# Patient Record
Sex: MALE | Race: OTHER | NOT HISPANIC OR LATINO | URBAN - METROPOLITAN AREA
[De-identification: names, ages, dates, MRNs, and addresses within clinical notes are randomized per-mention and may not be internally consistent; named-entity substitution may affect disease eponyms.]

---

## 2019-11-17 ENCOUNTER — EMERGENCY (EMERGENCY)
Facility: HOSPITAL | Age: 34
LOS: 1 days | Discharge: HOME | End: 2019-11-17
Attending: EMERGENCY MEDICINE | Admitting: EMERGENCY MEDICINE
Payer: OTHER MISCELLANEOUS

## 2019-11-17 VITALS
RESPIRATION RATE: 17 BRPM | OXYGEN SATURATION: 98 % | TEMPERATURE: 98 F | SYSTOLIC BLOOD PRESSURE: 156 MMHG | DIASTOLIC BLOOD PRESSURE: 110 MMHG | HEART RATE: 86 BPM

## 2019-11-17 VITALS
TEMPERATURE: 98 F | HEIGHT: 68 IN | WEIGHT: 255.07 LBS | RESPIRATION RATE: 16 BRPM | OXYGEN SATURATION: 98 % | HEART RATE: 100 BPM | DIASTOLIC BLOOD PRESSURE: 117 MMHG | SYSTOLIC BLOOD PRESSURE: 162 MMHG

## 2019-11-17 PROCEDURE — 99283 EMERGENCY DEPT VISIT LOW MDM: CPT | Mod: 25

## 2019-11-17 PROCEDURE — 73030 X-RAY EXAM OF SHOULDER: CPT | Mod: 26,LT

## 2019-11-17 PROCEDURE — 99053 MED SERV 10PM-8AM 24 HR FAC: CPT

## 2019-11-17 RX ORDER — LIDOCAINE 4 G/100G
1 CREAM TOPICAL ONCE
Refills: 0 | Status: COMPLETED | OUTPATIENT
Start: 2019-11-17 | End: 2019-11-17

## 2019-11-17 RX ORDER — IBUPROFEN 200 MG
600 TABLET ORAL ONCE
Refills: 0 | Status: COMPLETED | OUTPATIENT
Start: 2019-11-17 | End: 2019-11-17

## 2019-11-17 RX ADMIN — Medication 600 MILLIGRAM(S): at 04:37

## 2019-11-17 RX ADMIN — LIDOCAINE 1 PATCH: 4 CREAM TOPICAL at 04:36

## 2019-11-17 NOTE — ED PROVIDER NOTE - CLINICAL SUMMARY MEDICAL DECISION MAKING FREE TEXT BOX
shoulder pain, tender, pain w/ certain ROM, neurovascular intact, xray, analgesia, sling for comfort

## 2019-11-17 NOTE — ED PROVIDER NOTE - OBJECTIVE STATEMENT
34 yom pw left shoulder pain, after falling onto ground with left hand bracing himself (pushup position with 1 arm), pain to left shoulder, anterior, worse w/ movement, no paresthesia.  no other injury/pain.

## 2019-11-17 NOTE — ED PROVIDER NOTE - NSFOLLOWUPINSTRUCTIONS_ED_ALL_ED_FT
Follow up with your primary care doctor  Please remove the lidocaine patch we applied at around 4pm today (11/17/2019)  Alternate tylenol/motrin if you do not have any allergies/intolerance   You can take 600mg of motrin every 6 hours with food as needed  You can take 1000mg of tylenol every 6 hours as needed  Ice for swelling as needed (5-10 minutes every hour)  Keep it elevated to reduce swelling (with sling)  Return immediately for any new or worsening symptoms or any new concerns

## 2019-11-17 NOTE — ED PROVIDER NOTE - PATIENT PORTAL LINK FT
You can access the FollowMyHealth Patient Portal offered by [facility] by registering at the following website: [email or web address]. By joining [email or web address]’s FollowMyHealth portal, you will also be able to view your health information using other applications (apps) compatible with our system.

## 2019-11-17 NOTE — ED ADULT TRIAGE NOTE - CHIEF COMPLAINT QUOTE
BIBA for left shoulder pain. States he injured it around 5pm yesterday while apprehending an EDP at work. Pt is right hand dominant

## 2019-11-17 NOTE — ED PROVIDER NOTE - DIAGNOSTIC INTERPRETATION
ER Physician: Hoang Garcia  SHOULDER XRAY INTERPRETATION:  no acute fracture; no soft tissue swelling noted; normal bony alignment.

## 2019-11-23 DIAGNOSIS — M25.512 PAIN IN LEFT SHOULDER: ICD-10-CM

## 2024-11-07 ENCOUNTER — NON-APPOINTMENT (OUTPATIENT)
Age: 39
End: 2024-11-07

## 2024-11-08 ENCOUNTER — EMERGENCY (EMERGENCY)
Facility: HOSPITAL | Age: 39
LOS: 1 days | Discharge: HOME | End: 2024-11-08
Attending: EMERGENCY MEDICINE | Admitting: EMERGENCY MEDICINE
Payer: COMMERCIAL

## 2024-11-08 VITALS
DIASTOLIC BLOOD PRESSURE: 84 MMHG | TEMPERATURE: 99 F | SYSTOLIC BLOOD PRESSURE: 147 MMHG | RESPIRATION RATE: 17 BRPM | OXYGEN SATURATION: 100 % | HEART RATE: 74 BPM | WEIGHT: 216.93 LBS

## 2024-11-08 PROCEDURE — 10060 I&D ABSCESS SIMPLE/SINGLE: CPT

## 2024-11-08 PROCEDURE — 99283 EMERGENCY DEPT VISIT LOW MDM: CPT | Mod: 25

## 2024-11-08 PROCEDURE — 99282 EMERGENCY DEPT VISIT SF MDM: CPT | Mod: 25

## 2024-11-08 PROCEDURE — 30020 DRAINAGE OF NOSE LESION: CPT | Mod: LT

## 2024-11-08 NOTE — ED ADULT TRIAGE NOTE - HEART RATE (BEATS/MIN)
76 no difficulty bearing weight/no weakness/no numbness/no abrasion/no fever/no deformity/no bruising/no tingling/no back pain

## 2024-11-08 NOTE — ED PROVIDER NOTE - NSFOLLOWUPINSTRUCTIONS_ED_ALL_ED_FT
Otolaryngology (ENT)  [address] [address]  Phone: [phone]  Fax: [phone]    Please continue your antibiotics as prescribed and see ENT for followup.  Call for appointment.  If you have any problems with followup, please call the ED Referral Coordinator at [phone].  Return to the ER if symptoms worsen or other concerns.    Abscess    An abscess is an infected area that contains a collection of pus and debris. It can occur in almost any part of the body and occurs when the tissue gets infection. Symptoms include a painful mass that is red, warm, tender that might break open and HAVE drainage. If your health care provider gave you antibiotics make sure to take the full course and do not stop even if feeling better.     SEEK IMMEDIATE MEDICAL CARE IF YOU HAVE ANY OF THE FOLLOWING SYMPTOMS: chills, fever, muscle aches, or red streaking from the area.

## 2024-11-08 NOTE — ED ADULT NURSE NOTE - CHIEF COMPLAINT QUOTE
pt c/o left nostril abscess since last Sunday and pus coming out, went to UC yesterday given  Clindamycin and mupirocin.  stated " he had one abscess in october, and now is back saw ENT"

## 2024-11-08 NOTE — ED PROVIDER NOTE - CARE PROVIDERS DIRECT ADDRESSES
[email or web address]

## 2024-11-08 NOTE — ED PROVIDER NOTE - CLINICAL SUMMARY MEDICAL DECISION MAKING FREE TEXT BOX
intranasal nares abscess. somewhat firm with scabbed over component. I and d performed with min pus extracted. just started antibiotics. will continue, f/u ent for further eval. discussed need to r/o underlying mass. return if worsens

## 2024-11-08 NOTE — ED PROVIDER NOTE - ENMT, MLM
left nares with swelling/ mild redness exteriorly. interiorly with 0.5 cm scabbed over growth laterally, mostly firm/ tender.

## 2024-11-08 NOTE — ED PROVIDER NOTE - OBJECTIVE STATEMENT
here with pain and swelling of left nares x several days. Had some sinus problems last month. Went to ENT and had scope, told he had some fluid, prescribed antibiotic x 10 day (unsure name). Seemed to improve a little then pain in left nose started, gradually worsening with swelling. Went to urgent care yesterday and prescribed clindamycin and mupirocin ointment which he started taking. No fever, chills.

## 2024-11-08 NOTE — ED PROVIDER NOTE - CARE PROVIDER_API CALL
Michael Sosa  Otolaryngology  [address] [place] [address], [address]  [place] [address]  Phone: [phone]  Fax: [phone]  Follow Up Time:

## 2024-11-08 NOTE — ED ADULT TRIAGE NOTE - CHIEF COMPLAINT QUOTE
pt c/o left nostril abscess since last Sunday, went to UC yesterday given  Clindamycin and mupirocin.  stated " he had one abscess in october, and now is back saw ENT" pt c/o left nostril abscess since last Sunday and pus coming out, went to UC yesterday given  Clindamycin and mupirocin.  stated " he had one abscess in october, and now is back saw ENT"

## 2024-11-08 NOTE — ED PROVIDER NOTE - PATIENT PORTAL LINK FT
You can access the FollowMyHealth Patient Portal offered by [facility] by registering at the following website: [email or web address]. By joining [employer]’s FollowMyHealth portal, you will also be able to view your health information using other applications (apps) compatible with our system.

## 2024-11-11 DIAGNOSIS — J34.0 ABSCESS, FURUNCLE AND CARBUNCLE OF NOSE: ICD-10-CM

## 2024-11-11 PROBLEM — Z78.9 OTHER SPECIFIED HEALTH STATUS: Chronic | Status: ACTIVE | Noted: 2024-11-08

## 2024-11-19 PROBLEM — Z00.00 ENCOUNTER FOR PREVENTIVE HEALTH EXAMINATION: Status: ACTIVE | Noted: 2024-11-19

## 2024-11-20 ENCOUNTER — NON-APPOINTMENT (OUTPATIENT)
Age: 39
End: 2024-11-20

## 2024-11-20 ENCOUNTER — APPOINTMENT (OUTPATIENT)
Dept: OTOLARYNGOLOGY | Facility: CLINIC | Age: 39
End: 2024-11-20
Payer: COMMERCIAL

## 2024-11-20 VITALS — WEIGHT: 217 LBS | HEIGHT: 72 IN | BODY MASS INDEX: 29.39 KG/M2

## 2024-11-20 DIAGNOSIS — J34.89 OTHER SPECIFIED DISORDERS OF NOSE AND NASAL SINUSES: ICD-10-CM

## 2024-11-20 DIAGNOSIS — Z82.49 FAMILY HISTORY OF ISCHEMIC HEART DISEASE AND OTHER DISEASES OF THE CIRCULATORY SYSTEM: ICD-10-CM

## 2024-11-20 DIAGNOSIS — H69.93 UNSPECIFIED EUSTACHIAN TUBE DISORDER, BILATERAL: ICD-10-CM

## 2024-11-20 DIAGNOSIS — H93.13 TINNITUS, BILATERAL: ICD-10-CM

## 2024-11-20 DIAGNOSIS — J34.2 DEVIATED NASAL SEPTUM: ICD-10-CM

## 2024-11-20 PROCEDURE — 99204 OFFICE O/P NEW MOD 45 MIN: CPT | Mod: 25

## 2024-11-20 PROCEDURE — 92504 EAR MICROSCOPY EXAMINATION: CPT

## 2024-11-20 PROCEDURE — 31231 NASAL ENDOSCOPY DX: CPT

## 2024-11-20 RX ORDER — AZELASTINE HYDROCHLORIDE AND FLUTICASONE PROPIONATE 137; 50 UG/1; UG/1
137-50 SPRAY, METERED NASAL
Qty: 1 | Refills: 2 | Status: ACTIVE | COMMUNITY
Start: 2024-11-20 | End: 1900-01-01

## 2024-11-20 RX ORDER — METHYLPREDNISOLONE 4 MG/1
4 TABLET ORAL
Qty: 1 | Refills: 0 | Status: ACTIVE | COMMUNITY
Start: 2024-11-20 | End: 1900-01-01

## 2024-12-09 ENCOUNTER — APPOINTMENT (OUTPATIENT)
Dept: OTOLARYNGOLOGY | Facility: CLINIC | Age: 39
End: 2024-12-09
Payer: COMMERCIAL

## 2024-12-09 DIAGNOSIS — J34.2 DEVIATED NASAL SEPTUM: ICD-10-CM

## 2024-12-09 DIAGNOSIS — J34.89 OTHER SPECIFIED DISORDERS OF NOSE AND NASAL SINUSES: ICD-10-CM

## 2024-12-09 DIAGNOSIS — H69.93 UNSPECIFIED EUSTACHIAN TUBE DISORDER, BILATERAL: ICD-10-CM

## 2024-12-09 PROCEDURE — 99214 OFFICE O/P EST MOD 30 MIN: CPT | Mod: 25

## 2024-12-09 PROCEDURE — 31231 NASAL ENDOSCOPY DX: CPT

## 2024-12-09 PROCEDURE — 92504 EAR MICROSCOPY EXAMINATION: CPT

## 2024-12-09 PROCEDURE — 92557 COMPREHENSIVE HEARING TEST: CPT

## 2024-12-09 PROCEDURE — 92550 TYMPANOMETRY & REFLEX THRESH: CPT | Mod: 52

## 2024-12-20 ENCOUNTER — APPOINTMENT (OUTPATIENT)
Dept: OTOLARYNGOLOGY | Facility: CLINIC | Age: 39
End: 2024-12-20